# Patient Record
Sex: FEMALE | Race: WHITE
[De-identification: names, ages, dates, MRNs, and addresses within clinical notes are randomized per-mention and may not be internally consistent; named-entity substitution may affect disease eponyms.]

---

## 2023-07-07 ENCOUNTER — APPOINTMENT (OUTPATIENT)
Dept: DERMATOLOGY | Facility: CLINIC | Age: 23
End: 2023-07-07

## 2023-07-11 ENCOUNTER — APPOINTMENT (OUTPATIENT)
Dept: DERMATOLOGY | Facility: CLINIC | Age: 23
End: 2023-07-11

## 2023-08-22 ENCOUNTER — APPOINTMENT (OUTPATIENT)
Dept: DERMATOLOGY | Facility: CLINIC | Age: 23
End: 2023-08-22
Payer: COMMERCIAL

## 2023-08-22 VITALS — WEIGHT: 105 LBS | HEIGHT: 66 IN | BODY MASS INDEX: 16.88 KG/M2

## 2023-08-22 DIAGNOSIS — L70.0 ACNE VULGARIS: ICD-10-CM

## 2023-08-22 DIAGNOSIS — L74.510 PRIMARY FOCAL HYPERHIDROSIS, AXILLA: ICD-10-CM

## 2023-08-22 DIAGNOSIS — L85.8 OTHER SPECIFIED EPIDERMAL THICKENING: ICD-10-CM

## 2023-08-22 PROCEDURE — 17110 DESTRUCTION B9 LES UP TO 14: CPT

## 2023-08-22 PROCEDURE — 99204 OFFICE O/P NEW MOD 45 MIN: CPT | Mod: 25

## 2023-08-22 RX ORDER — TRETINOIN 0.25 MG/G
0.03 CREAM TOPICAL
Qty: 1 | Refills: 5 | Status: ACTIVE | COMMUNITY
Start: 2023-08-22 | End: 1900-01-01

## 2023-08-22 NOTE — ASSESSMENT
[FreeTextEntry1] : 1. Acne vulgaris - mild, comedonal  - Diagnosis and course of condition discussed  - Reviewed expected time course of improving on topical regimen (can take 6-8 weeks for earliest signs of improvement; 3-6 months should reach maximum anticipated improvement)  - Start Tretinoin 0.025% crm at bedtime. Apply pea size amt spread thinly over entire face, 30 min after washing face, every other night for 2 wks, then advance to qhs as tolerated. SED.  - Discussed liberal use of non-comedogenic moisturizers. Examples include neutrogena hydroboost, cerave moisturizing cream.   2. Follicular prominence, lower legs  - Orientation provided about nature of condition, treatment expectations, alternatives, risks and benefits - START tretinoin 0.025% per above   3. Seborrheic dermatitis possible sebopsoriasis  - Discussed the nature and course of this condition - Discussed treatment options, expectations from treatment, and associated side effects of topical therapies - START ketoconazole shampoo 2% applied to affected areas for 5 minutes then rinsed, 2-3 times per week. SED   4. Verruca vulgaris, on the R hand 2nd digit  - Discussed diagnosis including benign nature, viral etiology and contagiousness, natural progression and treatment options, including need for monthly treatment sessions  - S/p treatment with LN2 last tx > 1 year ago  - Start OTC salicylic acid nightly under occlusion. SED and proper application reviewed - handout provided   Procedure note: Cryotherapy  Site: R hand 2nd digit  Verbal consent obtained. Risks/benefits/alternatives discussed including but not limited to risk of pain, inflammatory and blistering reaction, infection, risk of permanent scar and/or dyspigmentation, recurrence, possible lack of efficacy and need for repeat treatments. Site confirmed. 1 lesion(s) treated with cryotherapy: liquid nitrogen x 2 rapid freeze-slow thaw cycles. Discussed wound care instructions. Patient tolerated well with no immediate complications.  5. Angioma with telangiectasia R nasal ala  - Discussed dx and course  - Discussed tx options  - Will refer to Dr. Saleem to discuss possible re-cauterization vs PDL   6. Hyperhidrosis of R axilla  - Discussed nature and usual course - Discussed treatment options (eg. Drysol, iontophoresis, glycopyrrolate, botox injections), expectations from treatment, and associated r/b/a of topical and systemic therapies - START OTC Drysol once nightly to affected areas. SED including irritation. Advised starting slowly (once every third night) and working up to nightly as tolerated. If causing significant irritation will send script for Qbrexa wipes   RTc 1 mo for repeat cryo or PRN

## 2023-08-22 NOTE — PHYSICAL EXAM
[FreeTextEntry3] : - open and closed comedones on the forehead and cheeks  - R axillae clear  - legs with folliculocentric pink-brown macules  - angioma and telangiectasias on the R nasal ala  - mild greasy scale in scalp, some areas with more well-demarcated scaly plaques  - verrucous papule on the R 2nd digit

## 2023-08-22 NOTE — HISTORY OF PRESENT ILLNESS
[FreeTextEntry1] : NPV: sweating, acne, bumps [de-identified] : Referred by: SELF,REFERRED  DAVINA SIERRA is a 23 year old female new patient who presents for evaluation of the followin. Excessive sweating under R axilla, ongoing a little over a year. Feels she soaks through her clothes, impacting her QoL. No previously attempted tx  2. Acne, ongoing x months, on the face, no prior treatments attempted. Has regular periods, acne does not flare with them 3. Dots on the legs, asx, ongoing x years 4. Wart on the L hand, previously treated with LN2 2-3 times, last time years ago 5. Scaling in the scalp, not itchy, ongoing x months 6. Angioma on the nose - cauterized 4 months prior but feels like it is regrowing   Family hx + for psoriasis

## 2023-08-25 PROBLEM — L85.8 KERATOSIS PILARIS: Status: ACTIVE | Noted: 2023-08-25

## 2023-09-15 ENCOUNTER — APPOINTMENT (OUTPATIENT)
Dept: DERMATOLOGY | Facility: CLINIC | Age: 23
End: 2023-09-15
Payer: COMMERCIAL

## 2023-09-15 DIAGNOSIS — L81.4 OTHER MELANIN HYPERPIGMENTATION: ICD-10-CM

## 2023-09-15 DIAGNOSIS — D18.00 HEMANGIOMA UNSPECIFIED SITE: ICD-10-CM

## 2023-09-15 DIAGNOSIS — D22.9 MELANOCYTIC NEVI, UNSPECIFIED: ICD-10-CM

## 2023-09-15 DIAGNOSIS — B07.9 VIRAL WART, UNSPECIFIED: ICD-10-CM

## 2023-09-15 PROCEDURE — 17110 DESTRUCTION B9 LES UP TO 14: CPT

## 2023-09-15 PROCEDURE — 99213 OFFICE O/P EST LOW 20 MIN: CPT | Mod: 25

## 2023-09-18 ENCOUNTER — APPOINTMENT (OUTPATIENT)
Dept: DERMATOLOGY | Facility: CLINIC | Age: 23
End: 2023-09-18

## 2024-03-19 ENCOUNTER — APPOINTMENT (OUTPATIENT)
Dept: DERMATOLOGY | Facility: CLINIC | Age: 24
End: 2024-03-19
Payer: COMMERCIAL

## 2024-03-19 DIAGNOSIS — D23.9 OTHER BENIGN NEOPLASM OF SKIN, UNSPECIFIED: ICD-10-CM

## 2024-03-19 DIAGNOSIS — L21.9 SEBORRHEIC DERMATITIS, UNSPECIFIED: ICD-10-CM

## 2024-03-19 PROCEDURE — 99214 OFFICE O/P EST MOD 30 MIN: CPT

## 2024-03-19 RX ORDER — KETOCONAZOLE 20.5 MG/ML
2 SHAMPOO, SUSPENSION TOPICAL
Qty: 1 | Refills: 10 | Status: ACTIVE | COMMUNITY
Start: 2023-08-22 | End: 1900-01-01

## 2024-03-19 RX ORDER — CLOBETASOL PROPIONATE 0.5 MG/ML
0.05 SOLUTION TOPICAL
Qty: 1 | Refills: 2 | Status: ACTIVE | COMMUNITY
Start: 2024-03-19 | End: 1900-01-01

## 2024-03-19 RX ORDER — HYDROCORTISONE 25 MG/G
2.5 CREAM TOPICAL
Qty: 1 | Refills: 2 | Status: ACTIVE | COMMUNITY
Start: 2024-03-19 | End: 1900-01-01